# Patient Record
Sex: FEMALE | Race: WHITE
[De-identification: names, ages, dates, MRNs, and addresses within clinical notes are randomized per-mention and may not be internally consistent; named-entity substitution may affect disease eponyms.]

---

## 2019-04-04 ENCOUNTER — HOSPITAL ENCOUNTER (OUTPATIENT)
Dept: HOSPITAL 95 - LAB SHORT | Age: 59
Discharge: HOME | End: 2019-04-04
Attending: PHYSICIAN ASSISTANT
Payer: COMMERCIAL

## 2019-04-04 DIAGNOSIS — M79.671: Primary | ICD-10-CM

## 2019-04-04 LAB
ALBUMIN SERPL BCP-MCNC: 3.7 G/DL (ref 3.4–5)
ALBUMIN/GLOB SERPL: 0.8 {RATIO} (ref 0.8–1.8)
ALT SERPL W P-5'-P-CCNC: 47 U/L (ref 12–78)
ANION GAP SERPL CALCULATED.4IONS-SCNC: 6 MMOL/L (ref 6–16)
AST SERPL W P-5'-P-CCNC: 26 U/L (ref 12–37)
BILIRUB SERPL-MCNC: 0.3 MG/DL (ref 0.1–1)
BUN SERPL-MCNC: 14 MG/DL (ref 8–24)
CALCIUM SERPL-MCNC: 8.9 MG/DL (ref 8.5–10.1)
CHLORIDE SERPL-SCNC: 102 MMOL/L (ref 98–108)
CO2 SERPL-SCNC: 31 MMOL/L (ref 21–32)
CREAT SERPL-MCNC: 1.14 MG/DL (ref 0.4–1)
GLOBULIN SER CALC-MCNC: 4.5 G/DL (ref 2.2–4)
GLUCOSE SERPL-MCNC: 105 MG/DL (ref 70–99)
POTASSIUM SERPL-SCNC: 4.1 MMOL/L (ref 3.5–5.5)
PROT SERPL-MCNC: 8.2 G/DL (ref 6.4–8.2)
SODIUM SERPL-SCNC: 139 MMOL/L (ref 136–145)
URATE SERPL-MCNC: 5.7 MG/DL (ref 2.6–6)

## 2022-01-27 NOTE — NUR
SHIFT SUMMARY
PATIENT ALERT AND ORIENTED. MEDICATED PER EMAR FOR PAIN. PATIENT HAS AN
UNSTEADY GAIT DUE TO RLE CELLULITUS. ABLE TO AMBULATE WITH 1 ASSIST USING A
FRONT WHEELED WALKER. NO ACUTE ISSUES NOTED OVERNIGHT. BED IN LOWEST POSITION
WITH WHEELS LOCKED AND ALARM ON. CALL LIGHT WITHIN REACH. REPORT GIVEN TO
ONCOMING RN.

## 2022-01-27 NOTE — NUR
ALERT. ORIENTED. PICTURES TAKEN OF RT LEG WHICH HAS REDNESS GOING FROM FOOT UP
TO INNER THIGH. MEDICATED T/O SHIFT FOR LEG PAIN WITH GOOD RESULTS. UNLABORED
RESPIRATIONS. ONE PERSON ASSIST . COOPERATIVE. ABLE TO MAKE NEEDS KNOWN. IV
PATENT. WCTM

## 2022-01-28 NOTE — NUR
SHIFT SUMMARY
PATIENT ALERT AND ORIENTED. HAD NO COMPLAINTS OF PAIN OR SHORTNESS OF BREATH.
NO ACUTE ISSUES NOTED OVERNIGHT. CALL LIGHT WITHIN REACH. REPORT GIVEN TO
ONCOMING RN.

## 2022-01-28 NOTE — NUR
SHIFT SUMMARY
PATIENT IN BED EATING DINNER. A&OX4. AMBULATES TO RESTROOM INDEPENDENTLY.
NO IMPROVEMENT NOTED IN REDNESS OF RIGHT LEG T/O SHIFT. PATIENT STATED THAT
SHE HAS CHRONIC NUMBNESS OF HER POSTERIOR THIGH. PATIENT DENIES ANY PAIN. VSS.
BED IN LOW POSITION WITH CALL LIGHT IN REACH. WILL CONTINUE TO MONITOR.

## 2022-01-29 NOTE — NUR
SHIFT SUMMARY
PT A/O X4; PLEASANT AND COOPERATIVE WITH CARE. NO ACUTE CHANGES THIS SHIFT.
CELLULITIS TO THE R LEG WHICH SEEMS TO BE IMPROVING. NO C/O PAIN THIS SHIFT.
PT HAS BEEN VERY TIRED AND HAS SLEPT FOR THE MAJORITY OF THE SHIFT. PT REPORTS
THAT SHE SLEPT POORLY LAST NIGHT. SBA TO THE BSC. VSS. WILL REPORT TO KACY CHIRINOS.

## 2022-01-29 NOTE — NUR
SHIFT SUMMARY - NO ACUTE CHANGES THROUGHOUT THIS SHIFT.  PT SLEPT FOR APPX 3-4
HOURS TONIGHT.  PT DENIED HEADACHE, CHEST PAIN, NAUSEA, OR PAIN.  TELE IN
PLACE.  CALL LIGHT WITHIN REACH.  BED IN LOW POSITION.  FLUIDS AT BEDSIDE.
WILL CONTINUE TO MONITOR UNTIL AM SHIFT CHANGE.

## 2022-01-30 NOTE — NUR
PATIENT HERE FOR RLE CELLULITIS WITH ASSOCIATED PAIN AND SWELLING. A&Ox4
STANDBY ASSIST. SLEPT MAJORITY OF SHIFT. HISTORY OF MSSA, MRSA AND STREP
CELLULITIS TO THE SAME LEG MULTIPLE TIMES. BLOOD CULTURES NEGATIVE. MRSA
NEGATIVE, BUT POSITIVE FOR 1+ STAPH AREUS. HAS COPD AND SMOKES HALF A PACK PER
DAY; HAS EXPIRATORY WHEEZES. EVENTUAL PLAN IS TO DC HOME WITH ORAL
ANTIBIOTICS.

## 2022-01-30 NOTE — NUR
SHIFT SUMMARY
61 F ADMITTED WITH CELLULITES OF R LOWER LEG. PT IS A&O AND PLEASANT AND
COOPERATIVE WITH CARE. PT HAD DISCUSSED LEAVING AMA WITH MD BUT DECIDED TO
STAY FOR CONT IV ABX. LR @200 STARTED THIS EVENING PER EMAR. PT HAS HAD SOME
ANXIETY AND FELT SHE NEEDED TO GET HOME TO CARE FOR HER 87 YEAR OLD MOTHER.
DISCUSSED WITH MD AND NEW MEDS ORDERED. NO OTHER CHANGES TO REPORT THIS SHIFT.

## 2022-01-31 NOTE — NUR
DISCHARGE
 
DISCHARGE INSTRUCTIONS, MEDICATION LIST AND FOLLOW UP APPOINTMENTS REVIEWED
WITH PT BY ALON SALDIVAR RN. TELE BOX REMOVED AND RETURNED. PT ESCORTED OUT VIA
W/C BY CHRISTIAN